# Patient Record
Sex: MALE | Race: BLACK OR AFRICAN AMERICAN | Employment: UNEMPLOYED | URBAN - METROPOLITAN AREA
[De-identification: names, ages, dates, MRNs, and addresses within clinical notes are randomized per-mention and may not be internally consistent; named-entity substitution may affect disease eponyms.]

---

## 2017-04-13 ENCOUNTER — TELEPHONE (OUTPATIENT)
Dept: CARDIOLOGY CLINIC | Age: 62
End: 2017-04-13

## 2017-04-27 ENCOUNTER — TELEPHONE (OUTPATIENT)
Dept: CARDIOLOGY CLINIC | Age: 62
End: 2017-04-27

## 2020-02-13 ENCOUNTER — APPOINTMENT (OUTPATIENT)
Dept: CT IMAGING | Age: 65
DRG: 056 | End: 2020-02-13
Payer: COMMERCIAL

## 2020-02-13 ENCOUNTER — HOSPITAL ENCOUNTER (INPATIENT)
Age: 65
LOS: 2 days | Discharge: HOME HEALTH | DRG: 056 | End: 2020-02-15
Attending: EMERGENCY MEDICINE | Admitting: INTERNAL MEDICINE
Payer: COMMERCIAL

## 2020-02-13 PROBLEM — G40.919 INTRACTABLE SEIZURE DISORDER (HCC): Status: ACTIVE | Noted: 2020-02-13

## 2020-02-13 LAB
AMORPHOUS: ABNORMAL /HPF
ANION GAP SERPL CALCULATED.3IONS-SCNC: 12 MMOL/L (ref 3–16)
BACTERIA: ABNORMAL /HPF
BASOPHILS ABSOLUTE: 0.1 K/UL (ref 0–0.2)
BASOPHILS RELATIVE PERCENT: 0.8 %
BILIRUBIN URINE: NEGATIVE
BLOOD, URINE: NEGATIVE
BUN BLDV-MCNC: 11 MG/DL (ref 7–20)
CALCIUM SERPL-MCNC: 9 MG/DL (ref 8.3–10.6)
CHLORIDE BLD-SCNC: 109 MMOL/L (ref 99–110)
CLARITY: CLEAR
CO2: 23 MMOL/L (ref 21–32)
COLOR: YELLOW
CREAT SERPL-MCNC: 0.8 MG/DL (ref 0.8–1.3)
EOSINOPHILS ABSOLUTE: 0.1 K/UL (ref 0–0.6)
EOSINOPHILS RELATIVE PERCENT: 0.7 %
EPITHELIAL CELLS, UA: ABNORMAL /HPF (ref 0–5)
GFR AFRICAN AMERICAN: >60
GFR NON-AFRICAN AMERICAN: >60
GLUCOSE BLD-MCNC: 108 MG/DL (ref 70–99)
GLUCOSE URINE: NEGATIVE MG/DL
HCT VFR BLD CALC: 38.7 % (ref 40.5–52.5)
HEMOGLOBIN: 12.4 G/DL (ref 13.5–17.5)
KETONES, URINE: NEGATIVE MG/DL
LEUKOCYTE ESTERASE, URINE: NEGATIVE
LYMPHOCYTES ABSOLUTE: 1.2 K/UL (ref 1–5.1)
LYMPHOCYTES RELATIVE PERCENT: 15.3 %
MCH RBC QN AUTO: 30.6 PG (ref 26–34)
MCHC RBC AUTO-ENTMCNC: 32 G/DL (ref 31–36)
MCV RBC AUTO: 95.6 FL (ref 80–100)
MICROSCOPIC EXAMINATION: YES
MONOCYTES ABSOLUTE: 0.5 K/UL (ref 0–1.3)
MONOCYTES RELATIVE PERCENT: 5.9 %
MUCUS: ABNORMAL /LPF
NEUTROPHILS ABSOLUTE: 5.9 K/UL (ref 1.7–7.7)
NEUTROPHILS RELATIVE PERCENT: 77.3 %
NITRITE, URINE: NEGATIVE
PDW BLD-RTO: 14.4 % (ref 12.4–15.4)
PH UA: 5.5 (ref 5–8)
PLATELET # BLD: 195 K/UL (ref 135–450)
PMV BLD AUTO: 9.9 FL (ref 5–10.5)
POTASSIUM SERPL-SCNC: 4 MMOL/L (ref 3.5–5.1)
PROTEIN UA: ABNORMAL MG/DL
RBC # BLD: 4.04 M/UL (ref 4.2–5.9)
RBC UA: ABNORMAL /HPF (ref 0–4)
SODIUM BLD-SCNC: 144 MMOL/L (ref 136–145)
SPECIFIC GRAVITY UA: >=1.03 (ref 1–1.03)
URINE TYPE: ABNORMAL
UROBILINOGEN, URINE: 1 E.U./DL
VALPROIC ACID LEVEL: 71.3 UG/ML (ref 50–100)
WBC # BLD: 7.7 K/UL (ref 4–11)
WBC UA: ABNORMAL /HPF (ref 0–5)

## 2020-02-13 PROCEDURE — 2500000003 HC RX 250 WO HCPCS: Performed by: EMERGENCY MEDICINE

## 2020-02-13 PROCEDURE — 96375 TX/PRO/DX INJ NEW DRUG ADDON: CPT

## 2020-02-13 PROCEDURE — 96367 TX/PROPH/DG ADDL SEQ IV INF: CPT

## 2020-02-13 PROCEDURE — 99285 EMERGENCY DEPT VISIT HI MDM: CPT

## 2020-02-13 PROCEDURE — 80183 DRUG SCRN QUANT OXCARBAZEPIN: CPT

## 2020-02-13 PROCEDURE — 6360000002 HC RX W HCPCS: Performed by: EMERGENCY MEDICINE

## 2020-02-13 PROCEDURE — 2580000003 HC RX 258

## 2020-02-13 PROCEDURE — 2060000000 HC ICU INTERMEDIATE R&B

## 2020-02-13 PROCEDURE — 2500000003 HC RX 250 WO HCPCS: Performed by: INTERNAL MEDICINE

## 2020-02-13 PROCEDURE — 2580000003 HC RX 258: Performed by: INTERNAL MEDICINE

## 2020-02-13 PROCEDURE — 96372 THER/PROPH/DIAG INJ SC/IM: CPT

## 2020-02-13 PROCEDURE — 80164 ASSAY DIPROPYLACETIC ACD TOT: CPT

## 2020-02-13 PROCEDURE — 80048 BASIC METABOLIC PNL TOTAL CA: CPT

## 2020-02-13 PROCEDURE — 96365 THER/PROPH/DIAG IV INF INIT: CPT

## 2020-02-13 PROCEDURE — 80165 DIPROPYLACETIC ACID FREE: CPT

## 2020-02-13 PROCEDURE — 2580000003 HC RX 258: Performed by: EMERGENCY MEDICINE

## 2020-02-13 PROCEDURE — 96366 THER/PROPH/DIAG IV INF ADDON: CPT

## 2020-02-13 PROCEDURE — 85025 COMPLETE CBC W/AUTO DIFF WBC: CPT

## 2020-02-13 PROCEDURE — 81001 URINALYSIS AUTO W/SCOPE: CPT

## 2020-02-13 PROCEDURE — 70450 CT HEAD/BRAIN W/O DYE: CPT

## 2020-02-13 PROCEDURE — 95813 EEG EXTND MNTR 61-119 MIN: CPT

## 2020-02-13 PROCEDURE — 6360000002 HC RX W HCPCS: Performed by: INTERNAL MEDICINE

## 2020-02-13 PROCEDURE — 36415 COLL VENOUS BLD VENIPUNCTURE: CPT

## 2020-02-13 RX ORDER — SODIUM CHLORIDE 0.9 % (FLUSH) 0.9 %
10 SYRINGE (ML) INJECTION PRN
Status: DISCONTINUED | OUTPATIENT
Start: 2020-02-13 | End: 2020-02-15 | Stop reason: HOSPADM

## 2020-02-13 RX ORDER — DIVALPROEX SODIUM 125 MG/1
750 CAPSULE, COATED PELLETS ORAL 2 TIMES DAILY
COMMUNITY

## 2020-02-13 RX ORDER — SODIUM CHLORIDE 0.9 % (FLUSH) 0.9 %
10 SYRINGE (ML) INJECTION EVERY 12 HOURS SCHEDULED
Status: DISCONTINUED | OUTPATIENT
Start: 2020-02-13 | End: 2020-02-15 | Stop reason: HOSPADM

## 2020-02-13 RX ORDER — DIVALPROEX SODIUM 125 MG/1
750 CAPSULE, COATED PELLETS ORAL 2 TIMES DAILY
Status: DISCONTINUED | OUTPATIENT
Start: 2020-02-13 | End: 2020-02-13

## 2020-02-13 RX ORDER — MIRTAZAPINE 15 MG/1
7.5 TABLET, FILM COATED ORAL NIGHTLY
Status: DISCONTINUED | OUTPATIENT
Start: 2020-02-13 | End: 2020-02-15 | Stop reason: HOSPADM

## 2020-02-13 RX ORDER — BROMOCRIPTINE MESYLATE 2.5 MG/1
7.5 TABLET ORAL 2 TIMES DAILY
Status: DISCONTINUED | OUTPATIENT
Start: 2020-02-13 | End: 2020-02-15 | Stop reason: HOSPADM

## 2020-02-13 RX ORDER — LORAZEPAM 2 MG/ML
1 INJECTION INTRAMUSCULAR ONCE
Status: COMPLETED | OUTPATIENT
Start: 2020-02-13 | End: 2020-02-13

## 2020-02-13 RX ORDER — LEVETIRACETAM 10 MG/ML
1000 INJECTION INTRAVASCULAR EVERY 12 HOURS SCHEDULED
Status: DISCONTINUED | OUTPATIENT
Start: 2020-02-13 | End: 2020-02-13 | Stop reason: SDUPTHER

## 2020-02-13 RX ORDER — ALBUTEROL SULFATE 2.5 MG/3ML
2.5 SOLUTION RESPIRATORY (INHALATION) EVERY 6 HOURS PRN
COMMUNITY

## 2020-02-13 RX ORDER — CARVEDILOL 12.5 MG/1
12.5 TABLET ORAL 2 TIMES DAILY WITH MEALS
Status: DISCONTINUED | OUTPATIENT
Start: 2020-02-14 | End: 2020-02-15 | Stop reason: HOSPADM

## 2020-02-13 RX ORDER — MIRTAZAPINE 7.5 MG/1
7.5 TABLET, FILM COATED ORAL NIGHTLY
COMMUNITY

## 2020-02-13 RX ORDER — ALBUTEROL SULFATE 2.5 MG/3ML
2.5 SOLUTION RESPIRATORY (INHALATION) EVERY 4 HOURS PRN
Status: DISCONTINUED | OUTPATIENT
Start: 2020-02-13 | End: 2020-02-15 | Stop reason: HOSPADM

## 2020-02-13 RX ORDER — ALBUTEROL SULFATE 2.5 MG/3ML
2.5 SOLUTION RESPIRATORY (INHALATION) EVERY 6 HOURS PRN
Status: DISCONTINUED | OUTPATIENT
Start: 2020-02-13 | End: 2020-02-13

## 2020-02-13 RX ORDER — ONDANSETRON 2 MG/ML
4 INJECTION INTRAMUSCULAR; INTRAVENOUS EVERY 6 HOURS PRN
Status: DISCONTINUED | OUTPATIENT
Start: 2020-02-13 | End: 2020-02-15 | Stop reason: HOSPADM

## 2020-02-13 RX ORDER — BROMOCRIPTINE MESYLATE 2.5 MG/1
7.5 TABLET ORAL 2 TIMES DAILY
COMMUNITY

## 2020-02-13 RX ORDER — CITALOPRAM 10 MG/1
10 TABLET ORAL DAILY
COMMUNITY

## 2020-02-13 RX ORDER — OXCARBAZEPINE 300 MG/1
300 TABLET, FILM COATED ORAL 2 TIMES DAILY
COMMUNITY

## 2020-02-13 RX ORDER — CARVEDILOL 12.5 MG/1
12.5 TABLET ORAL 2 TIMES DAILY WITH MEALS
COMMUNITY

## 2020-02-13 RX ORDER — ATORVASTATIN CALCIUM 80 MG/1
80 TABLET, FILM COATED ORAL NIGHTLY
COMMUNITY

## 2020-02-13 RX ORDER — ATORVASTATIN CALCIUM 80 MG/1
80 TABLET, FILM COATED ORAL NIGHTLY
Status: DISCONTINUED | OUTPATIENT
Start: 2020-02-13 | End: 2020-02-15 | Stop reason: HOSPADM

## 2020-02-13 RX ORDER — CITALOPRAM 10 MG/1
10 TABLET ORAL DAILY
Status: DISCONTINUED | OUTPATIENT
Start: 2020-02-13 | End: 2020-02-15 | Stop reason: HOSPADM

## 2020-02-13 RX ORDER — LEVETIRACETAM 5 MG/ML
500 INJECTION INTRAVASCULAR EVERY 12 HOURS
Status: DISCONTINUED | OUTPATIENT
Start: 2020-02-13 | End: 2020-02-14

## 2020-02-13 RX ORDER — SODIUM CHLORIDE 9 MG/ML
INJECTION, SOLUTION INTRAVENOUS
Status: COMPLETED
Start: 2020-02-13 | End: 2020-02-13

## 2020-02-13 RX ORDER — ALBUTEROL SULFATE 2.5 MG/3ML
2.5 SOLUTION RESPIRATORY (INHALATION) 2 TIMES DAILY
Status: DISCONTINUED | OUTPATIENT
Start: 2020-02-14 | End: 2020-02-15 | Stop reason: HOSPADM

## 2020-02-13 RX ADMIN — LEVETIRACETAM 1000 MG: 100 INJECTION, SOLUTION INTRAVENOUS at 16:39

## 2020-02-13 RX ADMIN — VALPROATE SODIUM 375 MG: 100 INJECTION, SOLUTION INTRAVENOUS at 21:37

## 2020-02-13 RX ADMIN — LORAZEPAM 1 MG: 2 INJECTION INTRAMUSCULAR; INTRAVENOUS at 16:12

## 2020-02-13 RX ADMIN — ENOXAPARIN SODIUM 40 MG: 40 INJECTION SUBCUTANEOUS at 18:48

## 2020-02-13 RX ADMIN — Medication 10 ML: at 21:37

## 2020-02-13 RX ADMIN — VALPROATE SODIUM 1400 MG: 100 INJECTION, SOLUTION INTRAVENOUS at 10:37

## 2020-02-13 RX ADMIN — SODIUM CHLORIDE: 9 INJECTION, SOLUTION INTRAVENOUS at 10:37

## 2020-02-13 ASSESSMENT — PAIN SCALES - GENERAL: PAINLEVEL_OUTOF10: 0

## 2020-02-13 NOTE — CARE COORDINATION
Meds To Beds General Rules:  1. Can ONLY be done Monday- Friday between 8:30am-5pm  2. Prescription(s) must be in pharmacy by 3pm to be filled same day  3. Copy of patient's insurance/ prescription drug card and patient face sheet must be sent along with the prescription(s)  4. Cost of Rx cannot be added to hospital bill. If financial assistance is needed, please contact unit  or ;  or  CANNOT provide pharmacy voucher for patients co-pays  5.  Patients can then  the prescription on their way out of the hospital at discharge, or pharmacy can deliver to the bedside if staff is available. (payment due at time of pick-up or delivery - cash, check, or card accepted)     Able to afford home medications/ co-pay costs: Yes    ADLS  Support Systems:      PT AM-PAC:   /24  OT AM-PAC:   /24    [place]: from [address] at [facility]  Steps:     Plans to RETURN to current housing: Yes  Barriers to RETURNING to current housing: medical complications    Pascale Anderson 76  Currently ACTIVE with [address]: No  Home Care Agency: Not Applicable    Currently ACTIVE with [place] [facility]: No      Durable Medical Equipment  DME Provider:   Equipment: provided by facility    Home Oxygen and [address] [place] prior to admission: No  [address]: Not Applicable    Dialysis  Active with HD/PD prior to admission: No  Nephrologist:     HD Center:  Not Applicable    DISCHARGE PLAN:  Disposition: [facility] (LTC): [facility]  Phone: [phone]  Fax: [phone]    Transportation PLAN for discharge: EMS transportation     Factors facilitating achievement of predicted outcomes: Family support    Barriers to discharge: Medical complications    Additional Case Management Notes: see note above    The Plan for Transition of Care is related to the following treatment goals of Intractable seizure disorder ([facility] [G40.919]  Intractable seizure disorder ([facility] [G40.919]    The Patient and/or patient representative Kasey Westfall and his family were provided with a choice of provider and agrees with the discharge plan Yes    Freedom of choice list was provided with basic dialogue that supports the patient's individualized plan of care/goals and shares the quality data associated with the providers.  Yes      Care Transition patient: No    SHARON Thomas, DANAS  [facility] [employer]  Case Management Department  Ph: [phone]

## 2020-02-13 NOTE — CONSULTS
 Facial weakness following cerebrovascular disease     Hyperlipemia     Hyperlipidemia     MI (myocardial infarction) ([facility]     x 2    Other symbolic dysfunctions     Overweight(278.02)     Seizures (HCC)     Type II or unspecified type diabetes mellitus without mention of complication, not stated as uncontrolled      Past Surgical History:   Procedure Laterality Date    CARDIAC DEFIBRILLATOR PLACEMENT  3/08    PACEMAKER INSERTION       Not in a hospital admission. Allergies   Allergen Reactions    Lisinopril Swelling     Family History   Problem Relation Age of Onset    Heart Failure Mother     Heart Failure Father      Social History     Tobacco Use   Smoking Status Former Smoker    Packs/day: 0.25    Years: 25.00    Pack years: 6.25    Types: Cigarettes   Smokeless Tobacco Never Used   Tobacco Comment    occas bums a cigarette     Social History     Substance and Sexual Activity   Drug Use Yes    Types: Cocaine    Comment: last used this evening approx 2000. Social History     Substance and Sexual Activity   Alcohol Use Yes    Alcohol/week: 2.0 standard drinks    Types: 2 Standard drinks or equivalent per week    Comment: Drank this evening approx 2000. ROS:  Unable to assess given aphasia      Exam:  Blood pressure 126/87, pulse 72, temperature 98.2 °F (36.8 °C), temperature source Oral, resp. rate 12, height 5' 8\" (1.727 m), weight 152 lb 2 oz (69 kg), SpO2 100 %. Constitutional    Vital signs: BP, HR, and RR reviewed   General Alert, no distress, well-nourished  Eyes: Unable to visualize the fundi  Cardiovascular: pulses symmetric in all 4 extremities. No peripheral edema.   Psychiatric: Limited exam given aphaisa  Neurologic  Mental status: Eyes open spontaneously  orientation Limited exam given aphasia   General fund of knowledge Limited exam given aphasia   Memory Limited exam given aphasia   Attention Poor, does not attend well to exam     Language Receptive and expressive aphasia   Comprehension Does not follow commands  Cranial nerves:   CN2: No clear blink to threat right or left  CN 3,4,6: Right gaze preference, unable to cross midline to the left  CN5: Unable to assess given aphasia  CN7: Right facial weakness  CN8: limited exam given aphaisa  CN9: palate , limited exam given aphaisa  CN11: trap, limited exam given aphasia  CN12: tongue, limited exam given aphasia  Strength: No movement RUE/RLE to peripheral noxious stimuli, LUE localizes with peripheral noxious stimuli, LLE slight withdraw to peripheral noxious stimuli  Deep tendon reflexes: Deferred throughout  Sensory: No grimace to peripheral noxious stimuli RUE/RLE,  LUE/LLE grimaces to peripheral noxious stimuli   Cerebellar/coordination: Unable to assess given aphasia  Tone: Increased throughout R>L, most pronounced RUE  Gait: non ambulatory  Other: Multiple episodes of left leg rhythmic jerking that spread to right leg rhythmic jerking that was not able to be suppressed. Eyes seemed to drive to the right during these episodes. Episodes ceased spontaneously after 20-30 seconds. Labs  VPA: 71.3  Glucose: 108  K: 4.0  BUN: 11  Creatinine: 0.8  WBC: 7.7    UA:   Ref. Range 2/13/2020 13:43   Nitrite, Urine Latest Ref Range: Negative  Negative   Leukocyte Esterase, Urine Latest Ref Range: Negative  Negative     Studies  None    Impression:  1. Abnormal movements  2. History of ischemic stroke  3. History of seizures  4. Stephen Mauricio is a 75 y.o. male who presented with multiple episodes of bilateral lower extremity jerking concerning for seizures. At baseline, he is very disabled. Recommendations:  - CT head  - cvEEG  - Trileptal level(ordered)  - Keppra 500 mg IV BID(ordered) until able to take PO.  Once able to take PO, please resume home dose Trileptal and discontinue Keppra at that time  - Depacon 750 mg IV  BID(ordered)  - Daily VPA level(ordered)    A copy of this note was provided for MD Benny Rae [address] Neurology

## 2020-02-13 NOTE — PROGRESS NOTES
4 Eyes Admission Assessment     I agree as the admission nurse that 2 RN's have performed a thorough Head to Toe Skin Assessment on the patient. ALL assessment sites listed below have been assessed on admission. Areas assessed by both nurses: Samara Means &   [x]   Head, Face, and Ears   [x]   Shoulders, Back, and Chest  [x]   Arms, Elbows, and Hands   [x]   Coccyx, Sacrum, and Ischum  [x]   Legs, Feet, and Heels        Does the Patient have Skin Breakdown?   No         Ricky Prevention initiated:  No   Wound Care Orders initiated:  No      [facility] nurse consulted for Pressure Injury (Stage 3,4, Unstageable, DTI, NWPT, and Complex wounds):  No      Nurse 1 eSignature: Electronically signed by Irene Freeman RN on 2/13/20 at 6:30 PM    **SHARE this note so that the co-signing nurse is able to place an eSignature**    Nurse 2 eSignature: {Esignature:[id number]}

## 2020-02-13 NOTE — ED NOTES
Son at bedside, updated on patients situation and all questions answered. IV sites intact, respirations are easy and unlabored. NSR on monitor with occasional PVC.        Taisha Dillon RN  02/13/20 [id number]

## 2020-02-13 NOTE — ED NOTES
Home Med List is complete. Source of medications in list is [facility] med list.    [facility] - unable to get in touch with anyone at this time. Unclear when he received his last doses of medicaitons. Addendum @ 15:40 -  Spoke with RN. She states he had no meds this morning. She also states that he needs his meds crushed, or capsules opened and sprinkled into applesauce. Per documentation, he did have his routine meds yesterday and Tuesday.        Marija SheikhD., BCPS   2/13/2020 10:37 AM  Wireless: [phone]

## 2020-02-13 NOTE — ED PROVIDER NOTES
[address] [facility]          ATTENDING PHYSICIAN NOTE       Date of evaluation: 2/13/2020    Chief Complaint     Seizures (patient brought in for seizures from [facility]. patient has been refusing his depakote. Patient was given versed 5mg IVP in route by EMS)    History of Present Illness     Casi Huertas is a 75 y.o. male who presents to the emergency department due to concern for seizure. Per EMS report the patient has been having seizures since yesterday (reported 6). They state the nursing home told them he had been refusing his Depakote for the last few days. They report they had given him 5 mg of IM Versed prior to arrival.  On arrival the patient has his eyes closed, opens them to painful stimuli, has no obvious signs of trauma, does not follow commands or talk. EMS does not report the patient does not speak and is non-verbal at baseline. FSBS for them wnl, initial here 184. No family at bedside available for collateral.     Review of Systems     Review of Systems   Unable to perform ROS: Acuity of condition     Past Medical, Surgical, Family, and Social History     He has a past medical history of Acid reflux, Altered mental status, unspecified, Aphasia following other cerebrovascular disease, Atrial fibrillation ([address] CAD (coronary artery disease), Cardiomyopathy ([address] Cerebral infarction ([address] Cerebrovascular disease, CHF (congestive heart failure) ([address] Chronic ischemic heart disease, Chronic kidney disease, unspecified, COPD (chronic obstructive pulmonary disease) ([address] Coronary atherosclerosis of native coronary artery, Facial weakness following cerebrovascular disease, Hyperlipemia, Hyperlipidemia, MI (myocardial infarction) ([address] Other symbolic dysfunctions, FIFQJIZOWE(412.69), Seizures ([address] and Type II or unspecified type diabetes mellitus without mention of complication, not stated as uncontrolled.   He has a past surgical history that Pulmonary effort is normal. No respiratory distress. Breath sounds: No wheezing or rales. Chest:      Chest wall: No tenderness. Abdominal:      Tenderness: There is no abdominal tenderness. There is no right CVA tenderness, left CVA tenderness or guarding. Musculoskeletal:      Right lower leg: No edema. Left lower leg: No edema. Lymphadenopathy:      Cervical: No cervical adenopathy. Skin:     General: Skin is warm and dry. Capillary Refill: Capillary refill takes less than 2 seconds. Comments: RUE with spasticity/contractures. RLE with contractures. Neurological:      Cranial Nerves: No facial asymmetry. Motor: Abnormal muscle tone present. Psychiatric:         Speech: He is noncommunicative.        Diagnostic Results     RADIOLOGY:  CT Head WO Contrast    (Results Pending)     LABS:   Results for orders placed or performed during the hospital encounter of 02/13/20   CBC Auto Differential   Result Value Ref Range    WBC 7.7 4.0 - 11.0 K/uL    RBC 4.04 (L) 4.20 - 5.90 M/uL    Hemoglobin 12.4 (L) 13.5 - 17.5 g/dL    Hematocrit 38.7 (L) 40.5 - 52.5 %    MCV 95.6 80.0 - 100.0 fL    MCH 30.6 26.0 - 34.0 pg    MCHC 32.0 31.0 - 36.0 g/dL    RDW 14.4 12.4 - 15.4 %    Platelets 188 354 - 780 K/uL    MPV 9.9 5.0 - 10.5 fL    Neutrophils % 77.3 %    Lymphocytes % 15.3 %    Monocytes % 5.9 %    Eosinophils % 0.7 %    Basophils % 0.8 %    Neutrophils Absolute 5.9 1.7 - 7.7 K/uL    Lymphocytes Absolute 1.2 1.0 - 5.1 K/uL    Monocytes Absolute 0.5 0.0 - 1.3 K/uL    Eosinophils Absolute 0.1 0.0 - 0.6 K/uL    Basophils Absolute 0.1 0.0 - 0.2 K/uL   Basic Metabolic Panel   Result Value Ref Range    Sodium 144 136 - 145 mmol/L    Potassium 4.0 3.5 - 5.1 mmol/L    Chloride 109 99 - 110 mmol/L    CO2 23 21 - 32 mmol/L    Anion Gap 12 3 - 16    Glucose 108 (H) 70 - 99 mg/dL    BUN 11 7 - 20 mg/dL    CREATININE 0.8 0.8 - 1.3 mg/dL    GFR Non-African American >60 >60    GFR  >60 >60 closed, does not respond to verbal stimuli though does respond to painful stimuli. He had received 5 mg of Versed likely contributing to his exam.  He had no obvious signs of trauma. His lungs are clear to auscultation bilaterally, abdomen is not distended, capillary refill approximately 2 seconds. Peripheral IV was placed, labs ordered along with urinalysis. Ordered depakote load as well with assistance from pharmacy. Nurse was able to call and speak with the nursing home who verified the patient had not been taking his medication for a few days and that his level yesterday was subtherapeutic 22. Reported he was noted to have started having seizures yesterday though she called today because she was not familiar with the patient and was worried about his seizures when she saw them today. Labs came back showing a total level of Depakote 77 (which is within the range that is supposed to be therapeutic) and otherwise were largely unremarkable including negative UA. Family did arrive at the bedside and reported the patient was at his baseline though concerned he was still having seizures and when asked what they look like they stated they were not sure because they had never seen him have seizures before. At that time the patient had some shaking/tremors in the lower extremities bilaterally with moaning noted which would intermittently stop when he was verbally stimulated. However, in between this he would also have what appeared to be spasms in his RLE followed by spastic increased tones. He was able to talk to patient's daughter (who is currently out of town) who reported what she saw at the nursing home yesterday (which is reportedly what he is currently doing) is consistent with his history of seizures.  We also called back and together spoke with the nursing home and the nurse there then reported he had received his Depakote medication yesterday though not a few days before that though she was not entirely sure. She was concerned he was having a seizure because he was \"staring off with a blank stare and his eyes were red\". I asked my pharmacist to call and gather further information and she was able to discern the patient had received Depakote both yesterday and the day before though the nurse then reported she was unable to review further back then that. Review of the electronic medical record shows patient last saw neurology in December 2019 at which time his seizure was noted to be a focal motor seizure in the right upper extremity. He does not appear to have any RUE symptoms today. Given concern for potential seizure neurology was consulted and ordered EEG and keppra load. Neurology came and evaluated patient at the bedside, patient continued to have both symptoms concerning for potential seizure as well as shaking not consistent with typical seizure. At that time was also noted to have right gaze deviation. Changed EEG to continuous, ordered ativan 1mg, as well as CT head to evaluate for potential other intracranial pathology. At the time of sign out CT head is pending. Will plan for admission to PCU for continuous EEG monitoring. Critical Care:  Due to the immediate potential for life-threatening deterioration due to seizures, I spent 45 minutes providing critical care. Thistime excludes time spent performing procedures but includes time spent on direct patient care, history retrieval, review of the chart, and discussions with patient, family, and consultant(s). Clinical Impression     1. History of seizures    2. Shaking    3. Abnormal increased muscle tone        Disposition     PATIENT REFERRED TO:  No follow-up provider specified.     DISCHARGE MEDICATIONS:  New Prescriptions    No medications on file       DISPOSITION Decision To Admit 02/13/2020 03:55:51 PM          Galdino Gold MD  02/13/20 8062

## 2020-02-14 LAB
GLUCOSE BLD-MCNC: 106 MG/DL (ref 70–99)
GLUCOSE BLD-MCNC: 131 MG/DL (ref 70–99)
GLUCOSE BLD-MCNC: 39 MG/DL (ref 70–99)
GLUCOSE BLD-MCNC: 44 MG/DL (ref 70–99)
GLUCOSE BLD-MCNC: 44 MG/DL (ref 70–99)
GLUCOSE BLD-MCNC: 52 MG/DL (ref 70–99)
GLUCOSE BLD-MCNC: 52 MG/DL (ref 70–99)
GLUCOSE BLD-MCNC: 59 MG/DL (ref 70–99)
GLUCOSE BLD-MCNC: 63 MG/DL (ref 70–99)
GLUCOSE BLD-MCNC: 64 MG/DL (ref 70–99)
GLUCOSE BLD-MCNC: 77 MG/DL (ref 70–99)
GLUCOSE BLD-MCNC: 82 MG/DL (ref 70–99)
GLUCOSE BLD-MCNC: 86 MG/DL (ref 70–99)
GLUCOSE BLD-MCNC: 86 MG/DL (ref 70–99)
GLUCOSE BLD-MCNC: 92 MG/DL (ref 70–99)
PERFORMED ON: ABNORMAL
PERFORMED ON: NORMAL
VALPROIC ACID LEVEL: 94.3 UG/ML (ref 50–100)

## 2020-02-14 PROCEDURE — 2580000003 HC RX 258: Performed by: INTERNAL MEDICINE

## 2020-02-14 PROCEDURE — 96366 THER/PROPH/DIAG IV INF ADDON: CPT

## 2020-02-14 PROCEDURE — 6360000002 HC RX W HCPCS: Performed by: INTERNAL MEDICINE

## 2020-02-14 PROCEDURE — 94640 AIRWAY INHALATION TREATMENT: CPT

## 2020-02-14 PROCEDURE — 96376 TX/PRO/DX INJ SAME DRUG ADON: CPT

## 2020-02-14 PROCEDURE — 80164 ASSAY DIPROPYLACETIC ACD TOT: CPT

## 2020-02-14 PROCEDURE — 95813 EEG EXTND MNTR 61-119 MIN: CPT

## 2020-02-14 PROCEDURE — 2060000000 HC ICU INTERMEDIATE R&B

## 2020-02-14 PROCEDURE — 36415 COLL VENOUS BLD VENIPUNCTURE: CPT

## 2020-02-14 PROCEDURE — 51798 US URINE CAPACITY MEASURE: CPT

## 2020-02-14 PROCEDURE — 2500000003 HC RX 250 WO HCPCS: Performed by: INTERNAL MEDICINE

## 2020-02-14 RX ORDER — DEXTROSE AND SODIUM CHLORIDE 5; .45 G/100ML; G/100ML
INJECTION, SOLUTION INTRAVENOUS CONTINUOUS
Status: DISCONTINUED | OUTPATIENT
Start: 2020-02-14 | End: 2020-02-15 | Stop reason: HOSPADM

## 2020-02-14 RX ORDER — LORAZEPAM 2 MG/ML
1 INJECTION INTRAMUSCULAR ONCE
Status: COMPLETED | OUTPATIENT
Start: 2020-02-14 | End: 2020-02-14

## 2020-02-14 RX ORDER — DEXTROSE MONOHYDRATE 50 MG/ML
100 INJECTION, SOLUTION INTRAVENOUS PRN
Status: DISCONTINUED | OUTPATIENT
Start: 2020-02-14 | End: 2020-02-15 | Stop reason: HOSPADM

## 2020-02-14 RX ORDER — NICOTINE POLACRILEX 4 MG
15 LOZENGE BUCCAL PRN
Status: DISCONTINUED | OUTPATIENT
Start: 2020-02-14 | End: 2020-02-15 | Stop reason: HOSPADM

## 2020-02-14 RX ORDER — DEXTROSE MONOHYDRATE 25 G/50ML
12.5 INJECTION, SOLUTION INTRAVENOUS PRN
Status: DISCONTINUED | OUTPATIENT
Start: 2020-02-14 | End: 2020-02-15 | Stop reason: HOSPADM

## 2020-02-14 RX ADMIN — VALPROATE SODIUM 375 MG: 100 INJECTION, SOLUTION INTRAVENOUS at 15:17

## 2020-02-14 RX ADMIN — DEXTROSE MONOHYDRATE 12.5 G: 500 INJECTION PARENTERAL at 08:49

## 2020-02-14 RX ADMIN — VALPROATE SODIUM 375 MG: 100 INJECTION, SOLUTION INTRAVENOUS at 22:06

## 2020-02-14 RX ADMIN — LORAZEPAM 1 MG: 2 INJECTION INTRAMUSCULAR; INTRAVENOUS at 22:06

## 2020-02-14 RX ADMIN — Medication 10 ML: at 08:53

## 2020-02-14 RX ADMIN — DEXTROSE MONOHYDRATE 12.5 G: 500 INJECTION PARENTERAL at 21:57

## 2020-02-14 RX ADMIN — DEXTROSE AND SODIUM CHLORIDE: 5; 450 INJECTION, SOLUTION INTRAVENOUS at 02:49

## 2020-02-14 RX ADMIN — VALPROATE SODIUM 375 MG: 100 INJECTION, SOLUTION INTRAVENOUS at 04:18

## 2020-02-14 RX ADMIN — ALBUTEROL SULFATE 2.5 MG: 2.5 SOLUTION RESPIRATORY (INHALATION) at 22:29

## 2020-02-14 RX ADMIN — VALPROATE SODIUM 375 MG: 100 INJECTION, SOLUTION INTRAVENOUS at 09:33

## 2020-02-14 RX ADMIN — DEXTROSE AND SODIUM CHLORIDE: 5; 450 INJECTION, SOLUTION INTRAVENOUS at 18:16

## 2020-02-14 RX ADMIN — ALBUTEROL SULFATE 2.5 MG: 2.5 SOLUTION RESPIRATORY (INHALATION) at 07:54

## 2020-02-14 RX ADMIN — DEXTROSE MONOHYDRATE 12.5 G: 500 INJECTION PARENTERAL at 12:21

## 2020-02-14 RX ADMIN — DEXTROSE MONOHYDRATE 12.5 G: 500 INJECTION PARENTERAL at 05:48

## 2020-02-14 RX ADMIN — DEXTROSE MONOHYDRATE 12.5 G: 500 INJECTION PARENTERAL at 22:15

## 2020-02-14 RX ADMIN — DEXTROSE MONOHYDRATE 12.5 G: 500 INJECTION PARENTERAL at 16:33

## 2020-02-14 RX ADMIN — LEVETIRACETAM 500 MG: 100 INJECTION, SOLUTION INTRAVENOUS at 12:17

## 2020-02-14 ASSESSMENT — PAIN SCALES - GENERAL
PAINLEVEL_OUTOF10: 0

## 2020-02-14 ASSESSMENT — PAIN SCALES - PAIN ASSESSMENT IN ADVANCED DEMENTIA (PAINAD)
FACIALEXPRESSION: 0
TOTALSCORE: 0
CONSOLABILITY: 0
NEGVOCALIZATION: 0
FACIALEXPRESSION: 0
TOTALSCORE: 0
CONSOLABILITY: 0
NEGVOCALIZATION: 0
NEGVOCALIZATION: 0
BREATHING: 0
TOTALSCORE: 0
NEGVOCALIZATION: 0
FACIALEXPRESSION: 0
BREATHING: 0
NEGVOCALIZATION: 0
BREATHING: 0
BODYLANGUAGE: 0
CONSOLABILITY: 0
CONSOLABILITY: 0
BODYLANGUAGE: 0
CONSOLABILITY: 0
CONSOLABILITY: 0
BREATHING: 0
TOTALSCORE: 0
BREATHING: 0
BODYLANGUAGE: 0
TOTALSCORE: 0
FACIALEXPRESSION: 0
NEGVOCALIZATION: 0
BREATHING: 0
BODYLANGUAGE: 0
BREATHING: 0
NEGVOCALIZATION: 0
TOTALSCORE: 0
BREATHING: 0
FACIALEXPRESSION: 0
TOTALSCORE: 0
TOTALSCORE: 0
BODYLANGUAGE: 0
FACIALEXPRESSION: 0
CONSOLABILITY: 0
BODYLANGUAGE: 0
BODYLANGUAGE: 0
NEGVOCALIZATION: 0
BODYLANGUAGE: 0
FACIALEXPRESSION: 0
FACIALEXPRESSION: 0
CONSOLABILITY: 0

## 2020-02-14 NOTE — PROGRESS NOTES
Neurology Progress Note:  Seen for seizures    Updates:  - More alert today  - No abnormal movements on exam today  - No electrographic seizures    ROS:  Unable to assess, patient aphasic      Exam:  Blood pressure (!) 134/96, pulse 61, temperature 97.7 °F (36.5 °C), temperature source Oral, resp. rate 16, height 5' 8\" (1.727 m), weight 152 lb 1.9 oz (69 kg), SpO2 96 %. Constitutional                          Vital signs: BP, HR, and RR reviewed            General Alert, no distress, well-nourished  Eyes: Unable to visualize the fundi  Cardiovascular: pulses symmetric in all 4 extremities. No peripheral edema. Psychiatric: Limited exam given aphaisa  Neurologic  Mental status: Eyes open spontaneously  orientation Limited exam given aphasia              General fund of knowledge Limited exam given aphasia              Memory Limited exam given aphasia              Attention Poor, does not attend well to exam                Language Receptive and expressive aphasia, nonverbal at baseline              Comprehension Does not follow commands  Cranial nerves:   CN2: No clear blink to threat right or left  CN 3,4,6: Tracks examiner to the left and right  CN5: Unable to assess given aphasia  CN7: Right facial weakness  CN8: limited exam given aphaisa  CN9: palate , limited exam given aphaisa  CN11: trap, limited exam given aphasia  CN12: tongue, limited exam given aphasia  Strength: RUE no movement to peripheral noxious stimuli, RLE flexion to gentle peripheral noxious stimuli, LUE/LLE spontaneous movement, can maintain antigravity for greater than 5 seconds  Sensory: Reacts to gentle peripheral noxious stimuli throughout  Cerebellar/coordination: Unable to assess given aphasia  Tone: Increased throughout R>L, most pronounced RUE  Gait: non ambulatory  Other: No abnormal movements        Labs  VPA: 94.3  Glucose: 86  BUN: 11  Creatinine: 0.8  WBC: 7.7     UA:    Ref.  Range 2/13/2020 13:43   Nitrite, Urine Latest Ref Range: Negative  Negative   Leukocyte Esterase, Urine Latest Ref Range: Negative  Negative      Studies  CT head  1. Increasing diffuse low-attenuation chronic periventricular and proximal left cerebral multifocal ischemic changes and right frontal and bilateral parietal remote ischemic changes and volume loss       2. No acute hemorrhage or midline shift. cvEEG  02/13/2020 22:01pm - 08:00am on 02/14/2020  SEIZURES:  None  INTERICTAL:  None  PUSHBUTTONS:  None  BACKGROUND:  Abundant generalized 1 hz delta slowing of the background with superimposed faster frequencies with lower amplitudes over the left. EKG:  regular     CLINICAL INTERPRETATION:  This was an abnormal tracing for age and state due to a severe generalized slow wave abnormality indicating diffuse cerebral dysfunction which can be of multiple causes, including structural, or vascular abnormalities, toxic/metabolic conditions, hydrocephalus, or postictal conditions. No epileptiform discharge, focal slowing, or seizures were seen during this recording. Clinical correlation is advised.       Impression:  1. Seizures  2. History of ischemic stroke  3. History of seizures  4. Aphaisa     76 yo man with known epilepsy following left MCA stroke admitted with recurrent simple and complex partial seizures in setting of possibly missing some medications. Depakote was continued, and Keppra was added until he is able to take PO(as he normally [facility] [place] [facility] No electrographic seizures while on cvEEG.    Recommendations:  -Discontinue cvEEG  -Plan to restart home antiepileptics without dose change, but may have to bridge with keppra until taking PO  - Continue Depakote  - Await Trileptal level  - May be ready for d/c from seizure standpoint as early as Saturday morning if no further seizures.     A copy of this note was provided for Dr Claudia Lopez MD     April Ammon [address] Neurology

## 2020-02-14 NOTE — CARE COORDINATION
Received call from Christine Ortez at [facility]. Patient does not need pre cert to return if he is going back to his long term care bed. If patient needs SNF he will need a precert. Weekend phone number for Cardell Boxer [facility]. to call if patient is [phone].

## 2020-02-14 NOTE — PLAN OF CARE
Problem: Nutrition  Goal: Optimal nutrition therapy  Outcome: Ongoing   Nutrition Problem: Predicted suboptimal energy intake  Intervention: Food and/or Nutrient Delivery: Start oral diet  Nutritional Goals: Pt will tolerate the most appropriate form of nutrition therapy this admission

## 2020-02-14 NOTE — PROGRESS NOTES
CONTINUOUS EEG    Name:  Kevin Damon  Medical Record Number:  [id number]  Age: 75 y.o. Gender: male  : 1955  Today's Date:  2020  Room:  [facility]4456-01  Vital Signs   BP (!) 130/97   Pulse 78   Temp 98 °F (36.7 °C) (Oral)   Resp 16   Ht 5' 8\" (1.727 m)   Wt 152 lb 2 oz (69 kg)   SpO2 98%   BMI 23.13 kg/m²           Continuous EEG Testing Start Time:  8751 PM    Continuous EEG Testing End Time:       Comments: Emma Marquez contacted [phone] to begin monitoring. Impedence on all leads within normal limits. Plan of Care: Begin monitoring.     Electronically signed by Luba Mcmillan on 2020 at 10:26 PM

## 2020-02-14 NOTE — PROGRESS NOTES
hold  4. Bronchodilators will be administered via Hand Held Nebulizer KEVIN [facility]) to patients when ANY of the following criteria are met  a. Incognizant or uncooperative          b. Patients treated with HHN at Home        c. Unable to demonstrate proper use of MDI with spacer     d. RR > 30 bpm   5. Bronchodilators will be delivered via Metered Dose Inhaler (MDI), HHN, Aerogen to intubated patients on mechanical ventilation. 6. Inhalation medication orders will be delivered and/or substituted as outlined below. Aerosolized Medications Ordering and Administration Guidelines:    1. All Medications will be ordered by a physician, and their frequency and/or modality will be adjusted as defined by the patients Respiratory Severity Index (RSI) score. 2. If the patient does not have documented COPD, consider discontinuing anticholinergics when RSI is less than 9.  3. If the bronchospasm worsens (increased RSI), then the bronchodilator frequency can be increased to a maximum of every 4 hours. If greater than every 4 hours is required, the physician will be contacted. 4. If the bronchospasm improves, the frequency of the bronchodilator can be decreased, based on the patient's RSI, but not less than home treatment regimen frequency. 5. Bronchodilator(s) will be discontinued if patient has a RSI less than 9 and has received no scheduled or as needed treatment for 72  Hrs. Patients Ordered on a Mucolytic Agent:    1. Must always be administered with a bronchodilator. 2. Discontinue if patient experiences worsened bronchospasm, or secretions have lessened to the point that the patient is able to clear them with a cough. Anti-inflammatory and Combination Medications:    1. If the patient lacks prior history of lung disease, is not using inhaled anti-inflammatory medication at home, and lacks wheezing by examination or by history for at least 24 hours, contact physician for possible discontinuation.

## 2020-02-14 NOTE — PROGRESS NOTES
Jad Anderson MD   Patient: Yajaira Morales   0'\"   2/14/20 9:59 AM   [phone] Hospital or Facility: [facility] From: Cirayan Michel RE: Maisha Pemberton RM: [id number] Patient had 10 beats of V-Tach. asymptomatic. Need Callback: NO CALLBACK REQ  Unread   Awaiting response.

## 2020-02-14 NOTE — CARE COORDINATION
Case Management Assessment           Daily Note                 Date/ Time of Note: 2/14/2020 12:52 PM         Note completed by: Alize See    Patient Name: Phi Levy  YOB: 1955    Diagnosis:Intractable seizure disorder ([facility] [G40.919]  Intractable seizure disorder ([facility] [G40.919]  Patient Admission Status: Inpatient    Date of Admission:2/13/2020  9:19 AM Length of Stay: 1 GLOS:        Current Plan of Care: cont eeg  ________________________________________________________________________________________  PT AM-PAC:   / 24 per last evaluation on: not ordered    OT AM-PAC:   / 24 per last evaluation on: not ordered    DME Needs for discharge:     ________________________________________________________________________________________  Discharge Plan: [address] [place] [address] ([facility]): [facility]    Tentative discharge date: TBD    Current barriers to discharge: medical clearance    Referrals completed: Not Applicable    Resources/ information provided: Not indicated at this time  ________________________________________________________________________________________  Case Management Notes:HUBER called and spoke to patients nurse whom states that the patient will have a bed upon return to [facility]. Left a message with Micah Garcia regarding need for precert at DC. Treasa Sandifer [address] and his family were provided with choice of provider; he and his family are in agreement with the discharge plan.     Care Transition Patient: Yes    Alize See RN  [facility]  Case Management Department  Ph: [phone]  Fax: [phone]

## 2020-02-14 NOTE — PROGRESS NOTES
Manjula Soto MD   Patient: Scott Tucker   0'\"   2/14/20 9:59 AM   [phone] Hospital or Facility: [facility] From: Pam Geiger RE: Amaya Nava RM: [id number] Patient had 10 beats of V-Tach. asymptomatic. Need Callback: NO CALLBACK REQ  Read 10:12 AM   0'\"   2/14/20 10:40 AM   Patient's son is here and wants to see you. Unread   Awaiting response.

## 2020-02-14 NOTE — H&P
Hospital Medicine History & Physical      PCP: Nathan Zavala MD    Date of Admission: 2/13/2020    Date of Service: Pt seen/examined on 2/13/20 and Admitted to Inpatient     Chief Complaint:  Frequent  seizures    History Of Present Illness: The patient is a 75 y.o. male with medical hxof MCA stroke on eliquis, hx of seizures who presented from NH with frequent seizures for last two days . Hx  obtained from ED provider and chart review, no family present at time of my exam, per chart review patient is back to his baseline mental status, he is nonverbal at baseline and does not walk. he had  few episodes of abnormal jerking/ seizure in ER. Apparently he has missed his seziure medications at [facility]. Admitted for continuous EEG and seizure workup.    Additional PMHx of DM, MI, NICM, EF of 10-15 %, BiV    Past Medical History:        Diagnosis Date    Acid reflux     Altered mental status, unspecified     Aphasia following other cerebrovascular disease     Atrial fibrillation ([facility]     CAD (coronary artery disease)     Dr. Yusef Andrea Cardiomyopathy [facility])     Cerebral infarction [facility])     Cerebrovascular disease     CHF (congestive heart failure) ([facility]     Chronic ischemic heart disease     Chronic kidney disease, unspecified     COPD (chronic obstructive pulmonary disease) ([facility]     Coronary atherosclerosis of native coronary artery     Facial weakness following cerebrovascular disease     Hyperlipemia     Hyperlipidemia     MI (myocardial infarction) ([facility]     x 2    Other symbolic dysfunctions     Overweight(278.02)     Seizures (HCC)     Type II or unspecified type diabetes mellitus without mention of complication, not stated as uncontrolled        Past Surgical History:        Procedure Laterality Date    CARDIAC DEFIBRILLATOR PLACEMENT  3/08    PACEMAKER INSERTION         Medications Prior to Admission:    Prior to Admission medications    Medication Sig Start Date End Date Taking? Authorizing Provider   atorvastatin (LIPITOR) 80 MG tablet Take 80 mg by mouth nightly   Yes Historical Provider, MD   bromocriptine (PARLODEL) 2.5 MG tablet Take 7.5 mg by mouth 2 times daily   Yes Historical Provider, MD   carvedilol (COREG) 12.5 MG tablet Take 12.5 mg by mouth 2 times daily (with meals)   Yes Historical Provider, MD   citalopram (CELEXA) 10 MG tablet Take 10 mg by mouth daily   Yes Historical Provider, MD   divalproex (DEPAKOTE SPRINKLE) 125 MG capsule Take 750 mg by mouth 2 times daily   Yes Historical Provider, MD   apixaban (ELIQUIS) 5 MG TABS tablet Take 5 mg by mouth 2 times daily   Yes Historical Provider, MD   mirtazapine (REMERON) 7.5 MG tablet Take 7.5 mg by mouth nightly   Yes Historical Provider, MD   OXcarbazepine (TRILEPTAL) 300 MG tablet Take 300 mg by mouth 2 times daily   Yes Historical Provider, MD   albuterol (PROVENTIL) (2.5 MG/3ML) 0.083% nebulizer solution Take 2.5 mg by nebulization every 6 hours as needed for Wheezing   Yes Historical Provider, MD       Allergies:  Lisinopril    Social History:  The patient currently lives  At [address] [place] [address]:   reports that he has quit smoking. His smoking use included cigarettes. He has a 6.25 pack-year smoking history. He has never used smokeless tobacco.  ETOH:   reports current alcohol use of about 2.0 standard drinks of alcohol per week. Family History:  Reviewed in detail and negative for DM, Early CAD, Cancer, CVA. Positive as follows:        Problem Relation Age of Onset    Heart Failure Mother     Heart Failure Father        REVIEW OF SYSTEMS:   Positive and negative  as noted in the HPI. All other systems reviewed and negative.     PHYSICAL EXAM:    /83   Pulse 77   Temp 98.1 °F (36.7 °C) (Axillary)   Resp 20   Ht 5' 8\" (1.727 m)   Wt 152 lb 2 oz (69 kg)   SpO2 100%   BMI

## 2020-02-14 NOTE — PROGRESS NOTES
Please see my attestation to H&P from yesterday for billing    Attending note:  I saw the patient for face to face encounter and performed key/critical portions of the history and physical examination including:     History:  76 yo with priro left MCA stroke and seizure disorder admitted from facility with recurrent seizures yesterday. He had not taken his VPA for a few days but had restarted the day prior to admission with seizures. He is doing well now with no seizures overnight.     Exam  He is alert sitting up in bed. He tracks well. No verbal response to me. He did not clearly follow any commands. Right facial weakness with severe weakness in right upper limb. Right lower limb minimal spontaneous movement. Left upper and lower move well.     Assessment/recs:  76 yo man with known epilepsy following left MCA stroke admitted with recurrent simple and complex partial seizures in setting of missing some medications. VPA was continued here  Keppra was added until he is able to take PO (as he [facility] [address] [facility] [address] Plan to restart home antiepileptics without dose change but may have to bridge with keppra until taking PO.   May be ready for d/c from seizure standpoint as early as Saturday morning if no further seizures.     A copy of this note was provided for MD Kendra Kendrick MD  [phone]  Evenings, weekends, and off weeks please discuss neurologist on-call

## 2020-02-14 NOTE — PROGRESS NOTES
Hospitalist Progress Note      PCP: Micki Gay MD    Date of Admission: 2/13/2020      Subjective:   Patient is unable to communicate effectively, he is alert    Medications:  Reviewed    Infusion Medications    dextrose 5 % and 0.45 % NaCl 75 mL/hr at 02/14/20 0249    dextrose       Scheduled Medications    levetiracetam  500 mg Intravenous Q12H    atorvastatin  80 mg Oral Nightly    bromocriptine  7.5 mg Oral BID    mirtazapine  7.5 mg Oral Nightly    citalopram  10 mg Oral Daily    sodium chloride flush  10 mL Intravenous 2 times per day    valproate sodium (DEPACON) IVPB  375 mg Intravenous Q6H    apixaban  5 mg Oral BID    carvedilol  12.5 mg Oral BID WC    albuterol  2.5 mg Nebulization BID     PRN Meds: glucose, dextrose, glucagon (rDNA), dextrose, sodium chloride flush, magnesium hydroxide, ondansetron, albuterol      Intake/Output Summary (Last 24 hours) at 2/14/2020 1447  Last data filed at 2/14/2020 1112  Gross per 24 hour   Intake 394.87 ml   Output 0 ml   Net 394.87 ml       Physical Exam Performed:    BP (!) 144/93   Pulse 69   Temp 97.7 °F (36.5 °C) (Oral)   Resp 16   Ht 5' 8\" (1.727 m)   Wt 152 lb 1.9 oz (69 kg)   SpO2 98%   BMI 23.13 kg/m²     General appearance: No apparent distress,   HEENT:  Conjunctivae/corneas clear. Neck: Supple, with full range of motion. Respiratory:  Normal respiratory effort. Clear to auscultation, bilaterally without Rales/Wheezes/Rhonchi. Cardiovascular: Regular rate and rhythm with normal S1/S2 without murmurs, rubs or gallops. Abdomen: Soft, non-tender, non-distended, normal bowel sounds. Musculoskeletal: No cyanosis or edema bilaterally  Neurologic:  without any focal sensory/motor deficits.  grossly non-focal.  Psychiatric: Alert and not oriented, Normal mood  Peripheral Pulses: +2 palpable, equal bilaterally       Labs:   Recent Labs     02/13/20 0944   WBC 7.7   HGB 12.4*   HCT 38.7*        Recent Labs     02/13/20 0944

## 2020-02-14 NOTE — CONSULTS
Clinical Pharmacy Progress Note  Medication History     Admit Date: 2/13/2020    Pharmacy asked to verify home medications and assist with switching PO medications to IV per Dr. Ludwig Malcolm. Medication history obtained by pharmacist in ED on 2/13 - please see note from them. ASSESSMENT/PLAN:  1)  Medication Review:  · Reviewed patient's home medications to determine which can be switched to IV formulation, if appropriate. · Recommendations:  · Apixaban: Pt takes for hx of AFib, hx of MCA stroke. If remains unable to take PO, consider switching to heparin gtt or enoxaparin 1 mg/kg BID (if therapeutic anticoagulation warranted) . · Atorvastatin:  No IV equivalent  · Bromocriptine: No IV equivalent  · Carvedilol:  HR 68-77 overnight; BP this /95. Could use scheduled IV metoprolol for BP/HR control while holding PO carvedilol. · Citalopram: No IV equivalent  · Divalproex: Switched to IV valproic acid per neurology. · Mirtazapine: No IV equivalent  · Oxcarbazepine: No IV equivalent. Per neurology note (2/13), Levetiracetam ordered while pt unable to take PO. Please call with any questions.   Jessika Webber, KoriD, BCPS  Wireless: [phone]  or [phone]  2/14/2020 8:11 AM

## 2020-02-15 VITALS
SYSTOLIC BLOOD PRESSURE: 127 MMHG | RESPIRATION RATE: 18 BRPM | TEMPERATURE: 97.4 F | HEIGHT: 68 IN | OXYGEN SATURATION: 96 % | DIASTOLIC BLOOD PRESSURE: 91 MMHG | BODY MASS INDEX: 23.05 KG/M2 | WEIGHT: 152.12 LBS | HEART RATE: 71 BPM

## 2020-02-15 LAB
GLUCOSE BLD-MCNC: 102 MG/DL (ref 70–99)
GLUCOSE BLD-MCNC: 112 MG/DL (ref 70–99)
GLUCOSE BLD-MCNC: 41 MG/DL (ref 70–99)
GLUCOSE BLD-MCNC: 47 MG/DL (ref 70–99)
GLUCOSE BLD-MCNC: 48 MG/DL (ref 70–99)
GLUCOSE BLD-MCNC: 59 MG/DL (ref 70–99)
GLUCOSE BLD-MCNC: 69 MG/DL (ref 70–99)
GLUCOSE BLD-MCNC: 83 MG/DL (ref 70–99)
GLUCOSE BLD-MCNC: 85 MG/DL (ref 70–99)
GLUCOSE BLD-MCNC: 99 MG/DL (ref 70–99)
PERFORMED ON: ABNORMAL
PERFORMED ON: NORMAL
VALPROIC ACID LEVEL: 114.5 UG/ML (ref 50–100)
VALPROIC ACID, FREE: 12.4 UG/ML (ref 7–23)

## 2020-02-15 PROCEDURE — 6370000000 HC RX 637 (ALT 250 FOR IP): Performed by: INTERNAL MEDICINE

## 2020-02-15 PROCEDURE — 36415 COLL VENOUS BLD VENIPUNCTURE: CPT

## 2020-02-15 PROCEDURE — 92610 EVALUATE SWALLOWING FUNCTION: CPT

## 2020-02-15 PROCEDURE — 2500000003 HC RX 250 WO HCPCS: Performed by: INTERNAL MEDICINE

## 2020-02-15 PROCEDURE — 94761 N-INVAS EAR/PLS OXIMETRY MLT: CPT

## 2020-02-15 PROCEDURE — 6360000002 HC RX W HCPCS: Performed by: INTERNAL MEDICINE

## 2020-02-15 PROCEDURE — 2580000003 HC RX 258: Performed by: INTERNAL MEDICINE

## 2020-02-15 PROCEDURE — 94640 AIRWAY INHALATION TREATMENT: CPT

## 2020-02-15 PROCEDURE — 96366 THER/PROPH/DIAG IV INF ADDON: CPT

## 2020-02-15 PROCEDURE — 80164 ASSAY DIPROPYLACETIC ACD TOT: CPT

## 2020-02-15 PROCEDURE — 96372 THER/PROPH/DIAG INJ SC/IM: CPT

## 2020-02-15 RX ADMIN — VALPROATE SODIUM 375 MG: 100 INJECTION, SOLUTION INTRAVENOUS at 04:07

## 2020-02-15 RX ADMIN — LEVETIRACETAM 500 MG: 100 INJECTION, SOLUTION INTRAVENOUS at 00:51

## 2020-02-15 RX ADMIN — VALPROATE SODIUM 375 MG: 100 INJECTION, SOLUTION INTRAVENOUS at 11:06

## 2020-02-15 RX ADMIN — ALBUTEROL SULFATE 2.5 MG: 2.5 SOLUTION RESPIRATORY (INHALATION) at 09:20

## 2020-02-15 RX ADMIN — CARVEDILOL 12.5 MG: 12.5 TABLET, FILM COATED ORAL at 19:43

## 2020-02-15 RX ADMIN — Medication 10 ML: at 12:18

## 2020-02-15 RX ADMIN — CARVEDILOL 12.5 MG: 12.5 TABLET, FILM COATED ORAL at 09:40

## 2020-02-15 RX ADMIN — GLUCAGON HYDROCHLORIDE 1 MG: KIT at 08:32

## 2020-02-15 RX ADMIN — CITALOPRAM HYDROBROMIDE 10 MG: 10 TABLET ORAL at 09:40

## 2020-02-15 RX ADMIN — DEXTROSE MONOHYDRATE 12.5 G: 500 INJECTION PARENTERAL at 04:27

## 2020-02-15 RX ADMIN — VALPROATE SODIUM 375 MG: 100 INJECTION, SOLUTION INTRAVENOUS at 16:52

## 2020-02-15 RX ADMIN — BROMOCRIPTINE MESYLATE 7.5 MG: 2.5 TABLET ORAL at 12:10

## 2020-02-15 RX ADMIN — APIXABAN 5 MG: 5 TABLET, FILM COATED ORAL at 09:40

## 2020-02-15 RX ADMIN — DEXTROSE MONOHYDRATE 12.5 G: 500 INJECTION PARENTERAL at 04:12

## 2020-02-15 RX ADMIN — LEVETIRACETAM 500 MG: 100 INJECTION, SOLUTION INTRAVENOUS at 14:29

## 2020-02-15 ASSESSMENT — PAIN SCALES - PAIN ASSESSMENT IN ADVANCED DEMENTIA (PAINAD)
NEGVOCALIZATION: 0
TOTALSCORE: 0
FACIALEXPRESSION: 0
NEGVOCALIZATION: 0
BODYLANGUAGE: 0
NEGVOCALIZATION: 0
FACIALEXPRESSION: 0
NEGVOCALIZATION: 0
BREATHING: 0
FACIALEXPRESSION: 0
FACIALEXPRESSION: 0
NEGVOCALIZATION: 0
BREATHING: 0
BREATHING: 0
TOTALSCORE: 0
FACIALEXPRESSION: 0
FACIALEXPRESSION: 0
CONSOLABILITY: 0
TOTALSCORE: 0
TOTALSCORE: 0
CONSOLABILITY: 0
BREATHING: 0
NEGVOCALIZATION: 0
BREATHING: 0
BREATHING: 0
CONSOLABILITY: 0
BREATHING: 0
BODYLANGUAGE: 0
BODYLANGUAGE: 0
FACIALEXPRESSION: 0
TOTALSCORE: 0
BODYLANGUAGE: 0
NEGVOCALIZATION: 0
CONSOLABILITY: 0
CONSOLABILITY: 0
BREATHING: 0
FACIALEXPRESSION: 0
TOTALSCORE: 0
NEGVOCALIZATION: 0
BREATHING: 0
TOTALSCORE: 0
BODYLANGUAGE: 0
CONSOLABILITY: 0
BODYLANGUAGE: 0
FACIALEXPRESSION: 0
TOTALSCORE: 0
CONSOLABILITY: 0
CONSOLABILITY: 0
FACIALEXPRESSION: 0
CONSOLABILITY: 0
BODYLANGUAGE: 0
BODYLANGUAGE: 0
FACIALEXPRESSION: 0
BODYLANGUAGE: 0
BREATHING: 0
NEGVOCALIZATION: 0
TOTALSCORE: 0
BREATHING: 0
TOTALSCORE: 0
TOTALSCORE: 0
CONSOLABILITY: 0
TOTALSCORE: 0
NEGVOCALIZATION: 0
BREATHING: 0
BODYLANGUAGE: 0
FACIALEXPRESSION: 0

## 2020-02-15 ASSESSMENT — PAIN SCALES - GENERAL
PAINLEVEL_OUTOF10: 0

## 2020-02-15 NOTE — DISCHARGE SUMMARY
Clear to auscultation, bilaterally without Rales/Wheezes/Rhonchi. Cardiovascular: Regular rate and rhythm with normal S1/S2 without murmurs, rubs or gallops. Abdomen: Soft, non-tender, non-distended, normal bowel sounds. Musculoskelatal: No clubbing, cyanosis or edema bilaterally. Full range of motion without deformity. Skin: Skin color, texture, turgor normal.  No rashes or lesions. Neurologic: Patient has difficulty in following commands, cannot assess neurological function  Psychiatric: Alert but cannot communicate, cannot check orientation   Consults:     IP CONSULT TO NEUROLOGY  IP CONSULT TO HOSPITALIST  IP CONSULT TO PHARMACY      Code Status:  DNR-CCA    Activity: activity as tolerated    Labs:  For convenience and continuity at follow-up the following most recent labs are provided:      CBC:    Lab Results   Component Value Date    WBC 7.7 02/13/2020    HGB 12.4 02/13/2020    HCT 38.7 02/13/2020     02/13/2020       Renal:    Lab Results   Component Value Date     02/13/2020    K 4.0 02/13/2020     02/13/2020    CO2 23 02/13/2020    BUN 11 02/13/2020    CREATININE 0.8 02/13/2020    CALCIUM 9.0 02/13/2020    PHOS 3.3 05/20/2016       Discharge Medications:     Current Discharge Medication List           Details   atorvastatin (LIPITOR) 80 MG tablet Take 80 mg by mouth nightly      bromocriptine (PARLODEL) 2.5 MG tablet Take 7.5 mg by mouth 2 times daily      carvedilol (COREG) 12.5 MG tablet Take 12.5 mg by mouth 2 times daily (with meals)      citalopram (CELEXA) 10 MG tablet Take 10 mg by mouth daily      divalproex (DEPAKOTE SPRINKLE) 125 MG capsule Take 750 mg by mouth 2 times daily      apixaban (ELIQUIS) 5 MG TABS tablet Take 5 mg by mouth 2 times daily      mirtazapine (REMERON) 7.5 MG tablet Take 7.5 mg by mouth nightly      OXcarbazepine (TRILEPTAL) 300 MG tablet Take 300 mg by mouth 2 times daily      albuterol (PROVENTIL) (2.5 MG/3ML) 0.083% nebulizer solution Take 2.5 mg

## 2020-02-15 NOTE — DISCHARGE INSTR - DIET

## 2020-02-15 NOTE — DISCHARGE INSTR - COC
Continuity of Care Form    Patient Name: Ivan Maravilla   :  1955  MRN:  [id number]    Admit date:  2020  Discharge date:  02/15/20      Code Status Order: DNR-CCA   Advance Directives:     Admitting Physician:  Evert Grace MD  PCP: Paul Thompson MD    Discharging Nurse:  [facility] Unit/Room#: 4322/3908-18  Discharging Unit Phone Number: [phone]    Emergency Contact:   Extended Emergency Contact Information  Primary Emergency Contact: Rosio Evans  Home Phone: [phone]  Relation: Legal Guardian  Secondary Emergency Contact: [facility]  Home Phone: [phone]  Relation: Child    Past Surgical History:  Past Surgical History:   Procedure Laterality Date    CARDIAC DEFIBRILLATOR PLACEMENT  3/08    PACEMAKER INSERTION         Immunization History:   Immunization History   Administered Date(s) Administered    Influenza 2011    Influenza Virus Vaccine 2013, 2014, 2015    Pneumococcal Conjugate Vaccine 10/19/2014       Active Problems:  Patient Active Problem List   Diagnosis Code    Cardiomyopathy ([facility] I42.9    HTN (hypertension) I10    DM type 2 (diabetes mellitus, type 2) ([facility] E11.9    Acute bronchitis J20.9    Wound of toenail W71.546F    Systolic CHF ([address] D88.44    Systolic CHF, acute on chronic (HCC) I50.23    Automatic implantable cardioverter-defibrillator in situ Z95.810    Acute exacerbation of CHF (congestive heart failure) (HCC) I50.9    Shortness of breath R06.02    Chronic systolic heart failure ([facility] I50.22    Coronary artery disease involving native heart with angina pectoris ([facility] I25.119    ICD (implantable cardioverter-defibrillator) discharge Z45.02    Abnormal ICD sensing T82.190A    Intractable seizure disorder ([address] G40.919       Isolation/Infection:   Isolation          No Isolation        Patient Infection Status     None to display          Nurse Assessment:  Last Vital Signs: /79 Risk Assessment Score:  Readmission Risk              Risk of Unplanned Readmission:        10           Discharging to Facility/ [address] [facility], [address] [address]       Phone: [phone]       Fax: [phone]          / signature: Electronically signed by Komal Milton RN on 2/15/20 at 2:52 PM    PHYSICIAN SECTION    Prognosis: Fair    Condition at Discharge: Stable    Rehab Potential (if transferring to Rehab): Fair    Recommended Labs or Other Treatments After Discharge: FOLLOW UP WITH NEUROLOGIST    Physician Certification: I certify the above information and transfer of [place]  is necessary for the continuing treatment of the diagnosis listed and that he requires [facility] for greater 30 days.      Update Admission H&P: No change in H&P    PHYSICIAN SIGNATURE:  Electronically signed by Jeronimo Barroso MD on 2/15/20 at 1:23 PM

## 2020-02-15 NOTE — PROGRESS NOTES
Pt's blood sugar 44, on-call hospitalist notified. Will continue to monitor.  Electronically signed by Grupo Haynes RN on 2/14/2020 at 8:42 PM

## 2020-02-15 NOTE — PROGRESS NOTES
BS 48. NPO. Tried to push a half amp of dextrose and patient's IV has infiltrated. Sent pharmacy a message to send dextrose IM injection.   Verónica Trevino

## 2020-02-15 NOTE — CARE COORDINATION
CM following for discharge planning. Pt discharging back to [facility] via [address] [facility] at 8:00pm this evening. Daughter and  Legal Gina Severs notified of discharge as well as facility and nurse. AVS faxed to facility. Report # [id number], Fax # [phone]. No precert or HENS needed. No further CM needs at this time.     Mela Medina RN, BSN, [address] [facility]  Case Management Department  [phone]

## 2020-02-15 NOTE — PROGRESS NOTES
Report called to [facility]. Patient and patient's son aware of discharge plan.  Leatha Alvarez RN

## 2020-02-15 NOTE — PROGRESS NOTES
Speech Language Pathology  Facility/Department: [place] [address] [facility]   CLINICAL BEDSIDE SWALLOW EVALUATION    NAME: Breezy Ornelas  : 1955  MRN: [id number]    ADMISSION DATE: 2020  ADMITTING DIAGNOSIS: has Cardiomyopathy ([address] HTN (hypertension); DM type 2 (diabetes mellitus, type 2) ([address] Acute bronchitis; Wound of toenail; Systolic CHF ([address] Systolic CHF, acute on chronic ([address] Automatic implantable cardioverter-defibrillator in situ; Acute exacerbation of CHF (congestive heart failure) ([address] Shortness of breath; Chronic systolic heart failure ([address] Coronary artery disease involving native heart with angina pectoris [facility]); ICD (implantable cardioverter-defibrillator) discharge; Abnormal ICD sensing; and Intractable seizure disorder ([address] on their problem list.  ONSET DATE: onset of dysphagia 2019 per chart - admitted 20    Recent Chest Xray/CT of Chest:   not performed this visit      CT head 20  1. Increasing diffuse low-attenuation chronic periventricular and proximal left cerebral multifocal ischemic changes and right frontal and bilateral parietal remote ischemic changes and volume loss       2. No acute hemorrhage or midline shift. Pt has had at least 5 MBSs in the past-  Most recent MBS found in  care everywhere 6/10/19  Radiology report:  Barium liquids, as well as semisolid food substances coated with barium, were swallowed. A nasoenteric tube is seen entering the esophagus. Early spillage of contrast to the vallecula and piriform sinus. Tracheal aspiration of thin barium. Laryngeal penetration of nectar consistency to the level of the vocal folds. No evidence of laryngeal penetration with puree and honey consistencies.    SLP impression-  Patient presents with oropharyngeal dysphagia secondary to perioral weakness and delayed swallow initiation, complicated by cognitive status / aphasia resulting in observed overt aspiration of thin liquids, and penetration to the vocal folds with nectar (ejected as swallow is completed.) There is no penetration/aspiration with honey or puree. With all liquids there is spillage to the pyriform sinuses. There is minimal post-swallow residue. Pt takes little from the spoon, particularly with puree, concerning for overall reduced intake and ability to meet nutritional needs by mouth only. Patient is at an increased risk of aspiration. Date of Eval: 2/15/2020  Evaluating Therapist: Fei Menjivar    Current Diet level:  Current Diet : Dysphagia Pureed (Dysphagia I)  Current Liquid Diet : Moderately Thick (Honey)      Primary Complaint  Patient Complaint: pt unable to state due to aphasia     Pain:  Pt did not respond via any means when questioned to pain- pt did not appear to be in any pain or distress     Reason for Referral  Dani Moritz was referred for a bedside swallow evaluation to assess the efficiency of his swallow function, identify signs and symptoms of aspiration and make recommendations regarding safe dietary consistencies, effective compensatory strategies, and safe eating environment. Impression   pt was admitted following seizure activity. Per chart, pt has a long standing history of dysphagia. Pt has had at least 5 MBSs in the past as found in [place] and was seen at [facility] in 2016. (see results of most recent MBS above)  Per hard chart, pt is on pureed diet with honey thick liquids at SNF. RN reported pt consumed pureed items for breakfast without difficulty, but RN noted some throat clearing with honey thick liquids and then pt became too lethargic to continue feeding. At this time, pt would wake easily, but then fall back a sleep within a few minutes. Per chart, pt is nonverbal at baseline. Pt did not attempt to communicate via any means and was unable to follow commands. Oral- not able to formally assess ROM of oral structures due to inability to follow commands. No obvious facial droop noted.   With trials of pureed, pt opened mouth slightly and took only 1/4 tsp trials with applesauce- mastication was slow. Pt was able to close lips around the straw but then did not attempt to draw honey thick juice up straw. Filled end of straw with honey thick juice then released it into pt's oral cavity-pt able to close lips around the straw for this trial. Attempted trial of honey by straw again and this time pt was able to draw honey thick liquid up the straw. Pharyngeal- pt unable to cough or swallow saliva on command. Laryngeal movement observed with all PO trials, but was delayed at times, likely due to periods of lethargy. No s/s aspiration noted with any trial. Recommend con't pureed with honey thick liquids ONLY when pt is adequately alert. Pt would also benefit from oral care with suction as well as having suction present in the even that the pt becomes lethargic during a meal. RN informed and will set up suction. Dysphagia Diagnosis: Moderate oral stage dysphagia; Moderate pharyngeal stage dysphagia  Dysphagia Outcome Severity Scale: Level 3: Moderate dysphagia- Total assisstance, supervision or strategies. Two or more diet consistencies restricted     Treatment Plan  Requires SLP Intervention: Yes  Duration/Frequency of Treatment: 2-4x  D/C Recommendations: To be determined       Recommended Diet and Intervention  Diet Solids Recommendation: Dysphagia Pureed (Dysphagia I)  Liquid Consistency Recommendation: Moderately Thick (Honey)-Make NPO if s/s aspiration emerge and alert SLP   only feed pt when adequately alert   Pt would benefit from oral care with suction    Recommended Form of Meds: Meds in puree  Recommendations: Dysphagia treatment  Therapeutic Interventions: Diet tolerance monitoring;Patient/Family education    Compensatory Swallowing Strategies  Compensatory Swallowing Strategies: Total feed;Remain upright for 30-45 minutes after meals;Small bites/sips;Eat/Feed slowly; Alternate solids and liquids(feed only

## 2020-02-16 NOTE — PROGRESS NOTES
Pt discharged via transport to [facility] with all belongings. IV removed and telemetry off. Will continue to monitor.  Electronically signed by Trey Chirinos RN on 2/15/2020 at 7:53 PM

## 2020-02-17 LAB — OXCARBAZEPINE: 3 UG/ML (ref 3–35)

## 2020-03-11 NOTE — ADT AUTH CERT
Seizure - Care Day 2 (2/14/2020) by Truman Kussmaul         Review Status Review Entered   Completed 3/11/2020 12:39       Criteria Review      Care Day: 2 Care Date: 2/14/2020 Level of Care:    Guideline Day 2    Level Of Care    ( ) Neurologic unit or floor to discharge    3/11/2020 12:38 PM EDT by [facility]      pcu    Clinical Status    ( ) * Hemodynamic stability    3/11/2020 12:39 PM EDT by [employer]      b/p- 119/101, 147/111, 132/101    3/11/2020 12:38 PM EDT by [employer]      elevated b/p    ( ) * Mental status at baseline    (X) * No evidence of CNS bleeding or infection    ( ) * No new neurologic deficits    (X) * Seizures absent or managed    ( ) * No evidence of seizure etiology requiring inpatient care    ( ) * Discharge plans and education understood    Activity    ( ) * Ambulatory    Routes    ( ) * Oral hydration, medications, and diet    3/11/2020 12:38 PM EDT by [employer]      st eval and tx    Interventions    (X) Neurologic checks and seizure monitoring    (X) Possible cardiac monitoring    3/11/2020 12:38 PM EDT by [employer]      telemetry    Medications    ( ) * Antiseizure regimen suitable for next level of care in place, if indicated [B]    3/11/2020 12:38 PM EDT by [employer]      iv kepra q12h, iv depacon q6h    * Milestone   Additional Notes   Continued Stay Review Note       2/14/20       Patient Visit Status: inpt   Level of Care: pcu       Last set of vitals: BP (!) 144/93   Pulse 69   Temp 97.7 °F (36.5 °C) (Oral)   Resp 16   Ht 5' 8\" (1.727 m)   Wt 152 lb 1.9 oz (69 kg)   SpO2 98%         Current Treatments: More alert today, remains non-verbal, unable to take po. Blood sugars fluctuating- 64, 63, 52, 44, 48; requiring iv d50 x8. Remains on ivf @ 75/hr, iv kepra q12h, iv depacon q6h, telemetry, continuous eeg.  Still npo; st eval and tx.        Review/Comments:   Frequent seizures:- continue home medication, appreciate neurology recommendations, neurology recommends to discharge patient Saturday if no further seizure episodes, continue iv keppra, iv depacon while not able to take po, continuous EEG nonspecific-neurology to review       DM type 2-currently hypoglycemic, DC insulin sliding scale       Chronic systolic HF, on biv, looks compensated       h x of MCA stroke, on eliquis       Neuro MD Note   Impression:   1. Seizures   2. History of ischemic stroke   3. History of seizures   4. Aphaisa       76 yo man with known epilepsy following left MCA stroke admitted with recurrent simple and complex partial seizures in setting of possibly missing some medications. Depakote was continued, and Keppra was added until he is able to take PO(as he normally takes [address] [email or web address]). No electrographic seizures while on cvEEG.         Recommendations:   -EEG   -Plan to restart home antiepileptics without dose change, but may have to bridge with keppra until taking PO   - Continue Depakote   - Await Trileptal level      Anticipated Discharge Plan: return to [facility]           Seizure - Care Day 1 (2/13/2020) by Jessie Garcia         Review Status Review Entered   Completed 3/11/2020 12:38       Criteria Review      Care Day: 1 Care Date: 2/13/2020 Level of Care:    Guideline Day 1    Level Of Care    (X) ICU, [C] neurologic unit, or floor    3/11/2020 12:38 PM EDT by Brittanie Ang      pcu    (X) Readmission Risk Assessment    Clinical Status    (X) * Clinical Indications met [D]    3/11/2020 12:38 PM EDT by Brittanie Ang      see clinical indications and review notes    (X) Possible postictal neurologic change    Routes    (X) Oral hydration, medications, and diet as tolerated    Interventions    (X) Laboratory tests [E]    (X) Neurologic checks and seizure monitoring    (X) Possible cardiac monitoring    3/11/2020 12:38 PM EDT by Brittanie Ang      telemetry    (X) Possible EEG, head imaging, lumbar puncture    3/11/2020 12:38 PM EDT by Varun Jolley Stefanie      continuous eeg    Medications    (X) Possible antiepileptic drug    3/11/2020 12:38 PM EDT by Willow Bernal      iv kepra q12h, iv depacon q6h    * Milestone       Seizure - Clinical Indications for Admission to Inpatient Care by [facility]         Review Status Review Entered   Completed 3/11/2020 12:33       Criteria Review      Clinical Indications for Admission to Inpatient Care    Most Recent : Willow Bernal Most Recent Date: 3/11/2020 12:33 PM EDT    (X) Admission is indicated for seizure and  1 or more  of the following  (1) (2) (3) (4) (5) (6)    (7):       (X) Hemodynamic instability       (X) Etiology (eg, drug toxicity, withdrawal, nonadherence) that requires monitoring or intervention       available only at inpatient level of care or that persists despite observation care (13)       (14)       3/11/2020 12:33 PM EDT by Yasmeen Hung to take po, requiring iv medications and continuous eeg monitoring       (X) Recurrent seizure (ie, during emergency department or observation care)       3/11/2020 12:33 PM EDT by Willow Bernal         reports 6 seizures in past 24 hours   Additional Notes   Requesting reconsideration of inpatient denial. Requiring inpatient care d/t multiple seizures, recurrent hypoglycemia, continuous eeg, iv meds d/t inability to take po      2/13/20      Chief complaints/Ed presentation, [address]   Seizures (patient brought in for seizures from [facility].  patient has been refusing his depakote.  Patient was given versed 5mg IVP in route by EMS)      ER Tx- iv kepra, iv ativan, iv depacon      Past Medical History   Diagnosis Date   · Acid reflux     · Altered mental status, unspecified     · Aphasia following other cerebrovascular disease     · Atrial fibrillation ([address]     · CAD (coronary artery disease)       Dr. Jessika Alex   · Cardiomyopathy [facility])     · Cerebral infarction [facility])     · Cerebrovascular disease     · CHF (congestive heart failure) ([facility])     · Chronic ischemic heart disease     · Chronic kidney disease, unspecified     · COPD (chronic obstructive pulmonary disease) ([facility])     · Coronary atherosclerosis of native coronary artery     · Facial weakness following cerebrovascular disease     · Hyperlipemia     · Hyperlipidemia     · MI (myocardial infarction) [address] [address]       x 2   · Other symbolic dysfunctions     · Overweight(278.02)     · Seizures ([facility] [address]     · Type II or unspecified type diabetes mellitus without mention of complication, not stated as uncontrolled        Reason for Admission   Phi Levy is a 75 y.o. male who presents to the emergency department due to concern for seizure.  Per EMS report the patient has been having seizures since yesterday (reported 6).  They state the nursing home told them he had been refusing his Depakote for the last few days. Lavon Martini report they had given him 5 mg of IM Versed prior to arrival.  On arrival the patient has his eyes closed, opens them to painful stimuli, has no obvious signs of trauma, does not follow commands or talk.  EMS does not report the patient does not speak and is non-verbal at baseline. Nursing home reports 6 seizures in past 24 hours      VS /102, 119/101, 147/111, 132/101   Pulse 77   Temp 98.1 °F (36.7 °C) (Axillary)   Resp 20   Ht 5' 8\" (1.727 m)   Wt 152 lb 2 oz (69 kg)   SpO2 93%        Physical Exam- at baseline      Radiology testing/Labs pertinent     02/13/20   0944   WBC 7.7   HGB 12.4*   HCT 38.7*          RENAL   Recent Labs     02/13/20   0944      K 4.0      CO2 23   BUN 11   CREATININE 0.8      CT of head   Impression:           1. Increasing diffuse low-attenuation chronic periventricular and proximal left cerebral multifocal ischemic changes and right frontal and bilateral parietal remote ischemic changes and volume loss      2. No acute hemorrhage or midline shift.       Treatment plan-attending, consults   Frequent seizures:- continue home medication, appreciate neurology recommendations, continue iv keppra, iv depacon while not able to take po, on continuous EEG       DM type 2, controlled check A1c, accuchecks every 4 hrs       Chronic systolic HF, on biv, looks compensated       h x of MCA stroke, on eliquis       DVT Prophylaxis: eliquis   Diet: Diet NPO Effective Now      Neuro MD Notes   Impression:   1. Abnormal movements   2. History of ischemic stroke   3. History of seizures   4. Sheri Fererira is a 75 y.o. male who presented with multiple episodes of bilateral lower extremity jerking concerning for seizures. At baseline, he is very disabled.        Recommendations:   - CT head   - cvEEG   - Trileptal level(ordered)   - Keppra 500 mg IV BID(ordered) until able to take PO.  Once able to take PO, please resume home dose Trileptal and discontinue Keppra at that time   - Depacon 750 mg IV  BID(ordered)   - Daily VPA level(ordered)      Orders- admit to pcu, iv kepra q12h, iv depacon q6h, continuous eeg, iv ativan x1, ivf @ 75/hr, iv d50 prn, fsbs ac and hs with sliding scale coverage, telemetry, npo